# Patient Record
Sex: FEMALE | Race: WHITE
[De-identification: names, ages, dates, MRNs, and addresses within clinical notes are randomized per-mention and may not be internally consistent; named-entity substitution may affect disease eponyms.]

---

## 2018-11-02 ENCOUNTER — HOSPITAL ENCOUNTER (OUTPATIENT)
Dept: HOSPITAL 62 - LC | Age: 23
Discharge: HOME | End: 2018-11-02
Attending: OBSTETRICS & GYNECOLOGY
Payer: OTHER GOVERNMENT

## 2018-11-02 DIAGNOSIS — O24.419: Primary | ICD-10-CM

## 2018-11-02 DIAGNOSIS — Z3A.36: ICD-10-CM

## 2018-11-02 PROCEDURE — 4A1HXCZ MONITORING OF PRODUCTS OF CONCEPTION, CARDIAC RATE, EXTERNAL APPROACH: ICD-10-PCS | Performed by: OBSTETRICS & GYNECOLOGY

## 2018-11-02 PROCEDURE — 59025 FETAL NON-STRESS TEST: CPT

## 2018-11-02 NOTE — NON STRESS TEST REPORT
=================================================================

Non Stress Test

=================================================================

Datetime Report Generated by CPN: 11/02/2018 19:32

   

   

=================================================================

DEMOGRAPHIC

=================================================================

   

EGA NST:  36.0

   

=================================================================

INDICATION

=================================================================

   

Indication for Study:  Other

Indication for Study (NST) Other:  GDM

   

=================================================================

VITAL SIGNS

=================================================================

   

Temperature - NST:  98.8

Pulse - NST:  80

RESP - NST:  18

NBPSYS NST:  93

NBPDIA NST:  55

   

=================================================================

MONITORING

=================================================================

   

Monitor Explained:  Monitor Explained; Test Explained; Patient

   Verbalized Understanding

Time on Monitor:  11/02/2018 18:15

Time off Monitor:  11/02/2018 18:40

NST Duration:  25

   

=================================================================

NST INTERVENTIONS

=================================================================

   

NST Interventions:  PO Hydration

Physician Notified NST:  Dr. Austin

BABY A:  [id number]

   

=================================================================

BABY A

=================================================================

   

Fetal Movement :  Present

Contraction Frequency :  0

FHR Baseline :  120

Accelerations :  15X15

Decelerations :  None

Variability :  Moderate 6-25bpm

NST Review:  Meets Criteria for Reactive NST

NST Review and Verified By :  RASHIDA taylor RN

NST Results:  Reactive

   

=================================================================

NST REPORT

=================================================================

   

Report Trigger:  Send Report

## 2018-11-24 ENCOUNTER — HOSPITAL ENCOUNTER (INPATIENT)
Dept: HOSPITAL 62 - LC | Age: 23
LOS: 2 days | Discharge: HOME | End: 2018-11-26
Attending: OBSTETRICS & GYNECOLOGY | Admitting: OBSTETRICS & GYNECOLOGY
Payer: OTHER GOVERNMENT

## 2018-11-24 DIAGNOSIS — Z67.21: ICD-10-CM

## 2018-11-24 DIAGNOSIS — O26.893: ICD-10-CM

## 2018-11-24 DIAGNOSIS — Z3A.39: ICD-10-CM

## 2018-11-24 DIAGNOSIS — Z88.2: ICD-10-CM

## 2018-11-24 LAB
ADD MANUAL DIFF: NO
APPEARANCE UR: (no result)
APTT PPP: YELLOW S
BARBITURATES UR QL SCN: (no result)
BASOPHILS # BLD AUTO: 0 10^3/UL (ref 0–0.2)
BASOPHILS NFR BLD AUTO: 0.3 % (ref 0–2)
BILIRUB UR QL STRIP: NEGATIVE
EOSINOPHIL # BLD AUTO: 0 10^3/UL (ref 0–0.6)
EOSINOPHIL NFR BLD AUTO: 0.4 % (ref 0–6)
ERYTHROCYTE [DISTWIDTH] IN BLOOD BY AUTOMATED COUNT: 13.5 % (ref 11.5–14)
GLUCOSE UR STRIP-MCNC: NEGATIVE MG/DL
HCT VFR BLD CALC: 37.9 % (ref 36–47)
HGB BLD-MCNC: 13 G/DL (ref 12–15.5)
KETONES UR STRIP-MCNC: NEGATIVE MG/DL
LYMPHOCYTES # BLD AUTO: 2.3 10^3/UL (ref 0.5–4.7)
LYMPHOCYTES NFR BLD AUTO: 18.5 % (ref 13–45)
MCH RBC QN AUTO: 30.3 PG (ref 27–33.4)
MCHC RBC AUTO-ENTMCNC: 34.3 G/DL (ref 32–36)
MCV RBC AUTO: 88 FL (ref 80–97)
METHADONE UR QL SCN: NEGATIVE
MONOCYTES # BLD AUTO: 0.7 10^3/UL (ref 0.1–1.4)
MONOCYTES NFR BLD AUTO: 5.4 % (ref 3–13)
NEUTROPHILS # BLD AUTO: 9.5 10^3/UL (ref 1.7–8.2)
NEUTS SEG NFR BLD AUTO: 75.4 % (ref 42–78)
NITRITE UR QL STRIP: NEGATIVE
PCP UR QL SCN: NEGATIVE
PH UR STRIP: 6 [PH] (ref 5–9)
PLATELET # BLD: 248 10^3/UL (ref 150–450)
PROT UR STRIP-MCNC: NEGATIVE MG/DL
RBC # BLD AUTO: 4.3 10^6/UL (ref 3.72–5.28)
SP GR UR STRIP: 1.01
TOTAL CELLS COUNTED % (AUTO): 100 %
URINE AMPHETAMINES SCREEN: NEGATIVE
URINE BENZODIAZEPINES SCREEN: NEGATIVE
URINE COCAINE SCREEN: NEGATIVE
URINE MARIJUANA (THC) SCREEN: NEGATIVE
UROBILINOGEN UR-MCNC: NEGATIVE MG/DL (ref ?–2)
WBC # BLD AUTO: 12.6 10^3/UL (ref 4–10.5)

## 2018-11-24 PROCEDURE — 86901 BLOOD TYPING SEROLOGIC RH(D): CPT

## 2018-11-24 PROCEDURE — 86900 BLOOD TYPING SEROLOGIC ABO: CPT

## 2018-11-24 PROCEDURE — 86592 SYPHILIS TEST NON-TREP QUAL: CPT

## 2018-11-24 PROCEDURE — 80345 DRUG SCREENING BARBITURATES: CPT

## 2018-11-24 PROCEDURE — 86850 RBC ANTIBODY SCREEN: CPT

## 2018-11-24 PROCEDURE — 0HQ9XZZ REPAIR PERINEUM SKIN, EXTERNAL APPROACH: ICD-10-PCS | Performed by: OBSTETRICS & GYNECOLOGY

## 2018-11-24 PROCEDURE — 36415 COLL VENOUS BLD VENIPUNCTURE: CPT

## 2018-11-24 PROCEDURE — 81005 URINALYSIS: CPT

## 2018-11-24 PROCEDURE — 85461 HEMOGLOBIN FETAL: CPT

## 2018-11-24 PROCEDURE — 85025 COMPLETE CBC W/AUTO DIFF WBC: CPT

## 2018-11-24 PROCEDURE — 4A1HXCZ MONITORING OF PRODUCTS OF CONCEPTION, CARDIAC RATE, EXTERNAL APPROACH: ICD-10-PCS | Performed by: OBSTETRICS & GYNECOLOGY

## 2018-11-24 PROCEDURE — 80307 DRUG TEST PRSMV CHEM ANLYZR: CPT

## 2018-11-24 PROCEDURE — G0480 DRUG TEST DEF 1-7 CLASSES: HCPCS

## 2018-11-24 PROCEDURE — 85027 COMPLETE CBC AUTOMATED: CPT

## 2018-11-24 PROCEDURE — 94760 N-INVAS EAR/PLS OXIMETRY 1: CPT

## 2018-11-24 RX ADMIN — Medication SCH CAP: at 10:20

## 2018-11-24 RX ADMIN — FERROUS SULFATE TAB 325 MG (65 MG ELEMENTAL FE) SCH MG: 325 (65 FE) TAB at 10:20

## 2018-11-24 RX ADMIN — DOCUSATE SODIUM SCH MG: 100 CAPSULE, LIQUID FILLED ORAL at 10:20

## 2018-11-24 RX ADMIN — IBUPROFEN SCH MG: 800 TABLET, FILM COATED ORAL at 22:26

## 2018-11-24 RX ADMIN — FERROUS SULFATE TAB 325 MG (65 MG ELEMENTAL FE) SCH MG: 325 (65 FE) TAB at 18:08

## 2018-11-24 RX ADMIN — SENNOSIDES, DOCUSATE SODIUM SCH EACH: 50; 8.6 TABLET, FILM COATED ORAL at 10:20

## 2018-11-24 RX ADMIN — IBUPROFEN SCH MG: 800 TABLET, FILM COATED ORAL at 14:23

## 2018-11-24 RX ADMIN — DOCUSATE SODIUM SCH MG: 100 CAPSULE, LIQUID FILLED ORAL at 18:09

## 2018-11-24 NOTE — ADMISSION PHYSICAL
=================================================================



=================================================================

Datetime Report Generated by CPN: 2018 03:17

   

   

=================================================================

CURRENT ADMISSION

=================================================================

   

Chief Complaint:  Uterine Contractions

Indication for Induction:  Maternal Diabetes

Admit Impression :  Term, Intrauterine Pregnancy; Active Labor

Admit Plan:  Admit to Unit; Initiate Labor Protocol

   

=================================================================

ALLERGIES

=================================================================

   

Medication Allergies:  Yes

Medication Allergies:  Sulfa (Sulfonamide Antibiotics) (2018)

Latex:  No Latex Allergies

   

=================================================================

OBSTETRICAL HISTORY

=================================================================

   

EDC:  2018 00:00

:  2

Para:  1

Term:  1

Livin

   

=================================================================

***SEE PRENATAL RECORDS***

=================================================================

   

Alcohol:  No

Marijuana :  No

Cocaine:  No

Other Illicit Drugs:  No

Cigarettes:  Never Smoker. [id number]

   

=================================================================

PHYSICAL EXAM

=================================================================

   

General:  Normal

HEENT:  Normal

Neurologic:  Normal

Thyroid:  Normal

Heart:  Normal

Lungs:  Normal

Breast:  Normal

Back:  Normal

Abdomen:  Normal

Genitourinary Exam:  Normal

Extremities:  Normal

DTRs:  Normal

Pelvic Type:  Adequate

Vital Signs:  Reviewed

   

=================================================================

VAGINAL EXAM

=================================================================

   

Dilatation:  5

Effacement:  90

Station:  -1

Contraction Comments:  q 2-3

   

=================================================================

MEMBRANES

=================================================================

   

Membranes:  Intact

   

=================================================================

FETUS A

=================================================================

   

EGA:  39.1

Monitoring:  External US

FHR- Baseline:  120s

Accelerations:  15X15

Decelerations:  None

FHR Category:  Category I

Admit Comment:  Pt has h/o macrosomia.

   

=================================================================

PLANS FOR LABOR AND DELIVERY

=================================================================

   

Labor and Delivery:  None

Pain Management:  Epidural

Feeding Preference:  Breast

Circumcision:  N/A

   

=================================================================

INFORMED CONSENT

=================================================================

   

Signature:  Electronically signed by MD KYLIE Yanez) on

   2018 at 03:16  with User ID: [email or web address]

## 2018-11-24 NOTE — DELIVERY SUMMARY
=================================================================

Del Sum A-C

=================================================================

Datetime Report Generated by CPN: 2018 09:25

   

   

=================================================================

DELIVERY PERSONNEL

=================================================================

   

DELIVERY PERSONNEL:  [id number]

Delivery Doctor::  Taniya Mehta MD

Labor and Delivery Nurse::  Fallon Mitchell RN

Labor and Delivery Nurse::  Amy Marks RN

Nursery Nurse::  Brianne Gay, RN

   

=================================================================

MATERNAL INFORMATION

=================================================================

   

Delivery Anesthesia:  Epidural

Medications After Delivery:  Pitocin Drip 20 Units/1000ml NSS

Maternal Complications:  None

   

=================================================================

LABOR SUMMARY

=================================================================

   

EDC:  2018 00:00

No. Babies in Womb:  1

 Attempted:  No

Labor Anesthesia:  Epidural

   

=================================================================

LABOR INFORMATION

=================================================================

   

Reason for Induction:  Not Applicable

Complete Dilatation:  2018 05:10

Oxytocin:  N/A

Group B Beta Strep:  neg (Annotations: Data stored by [facility] on behalf of

   user)

Antibiotics # of Doses:  0

Steroids Given:  None

Reason Steroids Not Administered:  Not Applicable

Other Reason Not Administered:  not indicated

   

=================================================================

MEMBRANES

=================================================================

   

Membranes Rupture Method:  Spontaneous

Rupture of Membranes:  2018 05:17

Length of Rupture (hr):  0.62

Amniotic Fluid Color:  Clear

Amniotic Fluid Amount:  Moderate

Amniotic Fluid Odor:  Normal

   

=================================================================

STAGES OF LABOR

=================================================================

   

Stage 2 hr:  0

Stage 2 min:  44

Stage 3 hr:  0

Stage 3 min:  5

   

=================================================================

VAGINAL DELIVERY

=================================================================

   

Episiotomy:  None

Laceration #1:  Perineal

Laceration Extension #1:  First Degree

Laceration Repair:  Yes

Sponge Count Correct:  Yes

Sharps Count Correct:  Yes

   

=================================================================

CSECTION DELIVERY

=================================================================

   

Primary Indication:  N/A

CSection Incidence:  N/A

Labor:  N/A

Elective:  N/A

CSection Incision:  N/A

   

=================================================================

BABY A INFORMATION

=================================================================

   

Infant Delivery Date/Time:  2018 05:54

Method of Delivery:  Vaginal

Born in Route :  No

:  N/A

Forceps:  N/A

Vacuum Extraction:  N/A

Shoulder Dystocia :  No

   

=================================================================

PRESENTATION/POSITION BABY A

=================================================================

   

Presentation:  Cephalic

Cephalic Presentation:  Vertex

Breech Presentation:  N/A

   

=================================================================

PLACENTA INFORMATION BABY A

=================================================================

   

Placenta Delivery Time :  2018 05:59

Placenta Method of Delivery:  Spontaneous

Placenta Status:  Delivered

   

=================================================================

APGAR SCORES BABY A

=================================================================

   

Heart Rate 1 min:  >100 bpm

Resp Effort 1 min:  Slow, Irregular

Reflex Irritability 1 min:  Cough or Sneeze or Pulls Away

Muscle Tone 1 min:  Active Motion

Color 1 min:  Body Pink, Extremities Blue

APGAR SCORE 1 MIN:  8

Heart Rate 5 min:  >100 bpm

Resp Effort 5 min:  Good Cry

Reflex Irritability 5 min:  Cough or Sneeze or Pulls Away

Muscle Tone 5 min:  Active Motion

Color 5 min:  Body Pink, Extremities Blue

APGAR SCORE 5 MIN:  9

   

=================================================================

INFANT INFORMATION BABY A

=================================================================

   

Gestational Age at Delivery:  39.1

Gestational Status:  Full Term- 39- 40.6 Weeks

Infant Outcome :  Liveborn

Infant Condition :  Stable

Infant Sex:  Female

   

=================================================================

IDENTIFICATION BABY A

=================================================================

   

Infant Verification Date/Time:  2018 07:21

ID Band Number:  [id number]

Mother's Name Verified:  Yes

Infant Medical Record Number:  [id number]

RN Verifying Infant:  Mandi RN/ GAIL Haynes RN

   

=================================================================

CORD INFORMATION BABY A

=================================================================

   

No. Cord Vessels:  3

Nuchal Cord :  N/A

Cord Blood Taken:  Yes-For Eval (Mom's Blood Type - or O+)

Infant Suction:  Mouth; Nose

   

=================================================================

ASSESSMENT BABY A

=================================================================

   

Infant Respirations:  Appears Normal

Skin to Skin:  Yes

Neonatologist/ALS Called :  No

Infant Care By:  ZAIRA Cabrera RN

Transferred To:  Remains with Mother

   

=================================================================

BABY B INFORMATION

=================================================================

   

 :  N/A

   

=================================================================

ASSESSMENT BABY B

=================================================================

   

Skin to Skin Time (min):  yes

## 2018-11-25 LAB
ERYTHROCYTE [DISTWIDTH] IN BLOOD BY AUTOMATED COUNT: 13.7 % (ref 11.5–14)
HCT VFR BLD CALC: 36.3 % (ref 36–47)
HGB BLD-MCNC: 12.5 G/DL (ref 12–15.5)
MCH RBC QN AUTO: 30.6 PG (ref 27–33.4)
MCHC RBC AUTO-ENTMCNC: 34.4 G/DL (ref 32–36)
MCV RBC AUTO: 89 FL (ref 80–97)
PLATELET # BLD: 228 10^3/UL (ref 150–450)
RBC # BLD AUTO: 4.09 10^6/UL (ref 3.72–5.28)
WBC # BLD AUTO: 10.7 10^3/UL (ref 4–10.5)

## 2018-11-25 PROCEDURE — 3E0234Z INTRODUCTION OF SERUM, TOXOID AND VACCINE INTO MUSCLE, PERCUTANEOUS APPROACH: ICD-10-PCS | Performed by: OBSTETRICS & GYNECOLOGY

## 2018-11-25 RX ADMIN — SENNOSIDES, DOCUSATE SODIUM SCH EACH: 50; 8.6 TABLET, FILM COATED ORAL at 09:47

## 2018-11-25 RX ADMIN — IBUPROFEN SCH MG: 800 TABLET, FILM COATED ORAL at 05:38

## 2018-11-25 RX ADMIN — DOCUSATE SODIUM SCH MG: 100 CAPSULE, LIQUID FILLED ORAL at 09:47

## 2018-11-25 RX ADMIN — IBUPROFEN SCH MG: 800 TABLET, FILM COATED ORAL at 21:43

## 2018-11-25 RX ADMIN — FERROUS SULFATE TAB 325 MG (65 MG ELEMENTAL FE) SCH MG: 325 (65 FE) TAB at 09:47

## 2018-11-25 RX ADMIN — FERROUS SULFATE TAB 325 MG (65 MG ELEMENTAL FE) SCH MG: 325 (65 FE) TAB at 18:20

## 2018-11-25 RX ADMIN — IBUPROFEN SCH MG: 800 TABLET, FILM COATED ORAL at 13:10

## 2018-11-25 RX ADMIN — DOCUSATE SODIUM SCH MG: 100 CAPSULE, LIQUID FILLED ORAL at 18:20

## 2018-11-25 RX ADMIN — Medication SCH CAP: at 09:47

## 2018-11-25 NOTE — PDOC PROGRESS REPORT
Subjective-OB


Progress Note for:: 11/25/18


Subjective: 





reports bleeding slowing, pain controlled with current meds, denies needs





Physical Exam (OB)


Vital Signs: 


 











Temp Pulse Resp BP Pulse Ox


 


 98.7 F   76   18   125/74   99 


 


 11/25/18 07:51  11/25/18 07:51  11/25/18 07:51  11/25/18 07:51  11/25/18 07:51








 Intake & Output











 11/24/18 11/25/18 11/26/18





 06:59 06:59 06:59


 


Intake Total  400 


 


Balance  400 


 


Weight 108.5 kg  














- Abdomen


Description: Soft


Hernia Present: No


Fundal Description: Firm, Midline


Fundal Height: u/3 - u/4





- Abdominal


Distension: No distension


Tenderness: Nontender





- Extremities


Lower extremities: Cele's sign - neg


Calf: Normal, Nontender





Objective-Diagnostic


Laboratory: 


 





 11/25/18 07:44 





 











  11/25/18 11/25/18





  07:44 07:44


 


WBC  10.7 H 


 


RBC  4.09 


 


Hgb  12.5 


 


Hct  36.3 


 


MCV  89 


 


MCH  30.6 


 


MCHC  34.4 


 


RDW  13.7 


 


Plt Count  228 


 


Blood Type   B NEGATIVE














Assessment and Plan(PN)





- Assessment and Plan


(1) Normal vaginal delivery


Is this a current diagnosis for this admission?: Yes   





(2) Obstetrical laceration


Is this a current diagnosis for this admission?: Yes   





- Time Spent with Patient


Time with patient: Less than 15 minutes


Medications reviewed and adjusted accordingly: Yes





- Disposition


Anticipated Discharge: Home


Within: within 24 hours

## 2018-11-26 VITALS — SYSTOLIC BLOOD PRESSURE: 106 MMHG | DIASTOLIC BLOOD PRESSURE: 57 MMHG

## 2018-11-26 RX ADMIN — IBUPROFEN SCH MG: 800 TABLET, FILM COATED ORAL at 05:03

## 2018-11-26 RX ADMIN — FERROUS SULFATE TAB 325 MG (65 MG ELEMENTAL FE) SCH MG: 325 (65 FE) TAB at 09:07

## 2018-11-26 RX ADMIN — DOCUSATE SODIUM SCH MG: 100 CAPSULE, LIQUID FILLED ORAL at 09:07

## 2018-11-26 RX ADMIN — SENNOSIDES, DOCUSATE SODIUM SCH EACH: 50; 8.6 TABLET, FILM COATED ORAL at 09:07

## 2018-11-26 RX ADMIN — Medication SCH CAP: at 09:07

## 2018-11-26 NOTE — PDOC PROGRESS REPORT
Subjective-OB


Progress Note for:: 11/26/18


Subjective: 





Follow up at [facility] in 4 wks or prn.  Pelvic rest x 4-6 wks.





Physical Exam (OB)


Vital Signs: 


 











Temp Pulse Resp BP Pulse Ox


 


 98.1 F   65   17   106/57 L  100 


 


 11/26/18 07:53  11/26/18 07:53  11/26/18 07:53  11/26/18 07:53  11/26/18 07:53








 Intake & Output











 11/25/18 11/26/18 11/27/18





 06:59 06:59 06:59


 


Intake Total 400 300 


 


Balance 400 300 














- PIH/Pre-Eclampsia


DTR's: 2 +


Clonus: Negative


Headache: Absent


Epigastric Pain: No


Visual Changes: No





- Lochia


Lochia Amount: Small 10-25 ml


Lochia Color: Rubra/Red





- Abdomen


Description: Soft


Hernia Present: No


Bowel Sounds: Normoactive


Flatus Presence: Present


Stool: No


Fundal Description: Firm, Midline


Fundal Height: u/u - u/2





Objective-Diagnostic


Laboratory: 


 





 11/25/18 07:44 





 











  11/25/18





  07:44


 


Blood Type  B NEGATIVE














Assessment and Plan(PN)





- Time Spent with Patient


Medications reviewed and adjusted accordingly: Yes





- Disposition


Anticipated Discharge: Home

## 2018-11-26 NOTE — PDOC DISCHARGE SUMMARY
Final Diagnosis


Discharge Date: 18





- Final Diagnosis








(2) Normal vaginal delivery


Is this a current diagnosis for this admission?: Yes   





(3) Obstetrical laceration


Is this a current diagnosis for this admission?: Yes   





Discharge Data





- Discharge Medication


Prescriptions: 


Docusate Sodium [Colace 100 mg Capsule] 100 mg PO BID #60 capsule


Prenatal Vit/Dha [Prenatal Multi + Dha Capsule] 1 cap PO DAILY #30 capsule


Home Medications: 








Prenatal 118/Iron/Folate 6/Dha [[facility] Softgel] 1 tab PO DAILY 18 


Docusate Sodium [Colace 100 mg Capsule] 100 mg PO BID #60 capsule 18 


Prenatal Vit/Dha [Prenatal Multi + Dha Capsule] 1 cap PO DAILY #30 capsule  








Gestational Age: 39.1 wks


Reason(s) for Admission: Onset of Labor


Prenatal Procedures: Ultrasound


Intrapartum Procedure(s): Spontaneous Vaginal Delivery


Postpartum Complication(s): Laceration-Perineal


Laceration-Degree: 1st





- [facility] Data


  ** Baby 1 Female


Apgar at 1 minute: 8


Apgar at 5 minutes: 9


Weight: 3.827 kg


Home with Mother: Yes


Complications: No





- Diagnosis Test


Laboratory: 


 











Temp Pulse Resp BP Pulse Ox


 


 98.1 F   65   17   106/57 L  100 


 


 18 07:53  18 07:53  18 07:53  18 07:53  18 07:53








 











  18





  01:33 04:21 07:44


 


RBC   4.30  4.09


 


Hgb   13.0  12.5


 


Hct   37.9  36.3


 


Urine Opiates Screen  NEGATIVE  














- Discharge information/Instructions


Discharge Activity: Activity As Tolerated, Balance Activity w/Rest, Pelvic Rest

, Slowly Increase Activity, No tub bath


Discharge Diet: Regular


Disposition: HOME, SELF-CARE


Follow up with: [facility]


in: 4, Weeks

## 2019-02-12 ENCOUNTER — HOSPITAL ENCOUNTER (OUTPATIENT)
Dept: HOSPITAL 62 - END | Age: 24
Discharge: HOME | End: 2019-02-12
Attending: INTERNAL MEDICINE
Payer: OTHER GOVERNMENT

## 2019-02-12 VITALS — DIASTOLIC BLOOD PRESSURE: 53 MMHG | SYSTOLIC BLOOD PRESSURE: 106 MMHG

## 2019-02-12 DIAGNOSIS — F17.210: ICD-10-CM

## 2019-02-12 DIAGNOSIS — R76.11: ICD-10-CM

## 2019-02-12 DIAGNOSIS — R91.8: Primary | ICD-10-CM

## 2019-02-12 DIAGNOSIS — E66.9: ICD-10-CM

## 2019-02-12 DIAGNOSIS — Z20.1: ICD-10-CM

## 2019-02-12 LAB
ADD MANUAL DIFF: NO
ANION GAP SERPL CALC-SCNC: 9 MMOL/L (ref 5–19)
APTT BLD: 26.8 SEC (ref 23.5–35.8)
BASOPHILS # BLD AUTO: 0.1 10^3/UL (ref 0–0.2)
BASOPHILS NFR BLD AUTO: 0.9 % (ref 0–2)
BUN SERPL-MCNC: 15 MG/DL (ref 7–20)
CALCIUM: 9.7 MG/DL (ref 8.4–10.2)
CHLORIDE SERPL-SCNC: 106 MMOL/L (ref 98–107)
CO2 SERPL-SCNC: 27 MMOL/L (ref 22–30)
EOSINOPHIL # BLD AUTO: 0 10^3/UL (ref 0–0.6)
EOSINOPHIL NFR BLD AUTO: 0.8 % (ref 0–6)
ERYTHROCYTE [DISTWIDTH] IN BLOOD BY AUTOMATED COUNT: 13.4 % (ref 11.5–14)
GLUCOSE SERPL-MCNC: 90 MG/DL (ref 75–110)
HCT VFR BLD CALC: 39.7 % (ref 36–47)
HGB BLD-MCNC: 14 G/DL (ref 12–15.5)
INR PPP: 0.96
LYMPHOCYTES # BLD AUTO: 2.1 10^3/UL (ref 0.5–4.7)
LYMPHOCYTES NFR BLD AUTO: 37.3 % (ref 13–45)
MCH RBC QN AUTO: 30.5 PG (ref 27–33.4)
MCHC RBC AUTO-ENTMCNC: 35.4 G/DL (ref 32–36)
MCV RBC AUTO: 86 FL (ref 80–97)
MONOCYTES # BLD AUTO: 0.4 10^3/UL (ref 0.1–1.4)
MONOCYTES NFR BLD AUTO: 6.6 % (ref 3–13)
NEUTROPHILS # BLD AUTO: 3 10^3/UL (ref 1.7–8.2)
NEUTS SEG NFR BLD AUTO: 54.4 % (ref 42–78)
PLATELET # BLD: 260 10^3/UL (ref 150–450)
POTASSIUM SERPL-SCNC: 4.4 MMOL/L (ref 3.6–5)
PROTHROMBIN TIME: 13.3 SEC (ref 11.4–15.4)
RBC # BLD AUTO: 4.59 10^6/UL (ref 3.72–5.28)
SODIUM SERPL-SCNC: 142.1 MMOL/L (ref 137–145)
TOTAL CELLS COUNTED % (AUTO): 100 %
WBC # BLD AUTO: 5.6 10^3/UL (ref 4–10.5)

## 2019-02-12 PROCEDURE — 80048 BASIC METABOLIC PNL TOTAL CA: CPT

## 2019-02-12 PROCEDURE — 31624 DX BRONCHOSCOPE/LAVAGE: CPT

## 2019-02-12 PROCEDURE — 36415 COLL VENOUS BLD VENIPUNCTURE: CPT

## 2019-02-12 PROCEDURE — 87205 SMEAR GRAM STAIN: CPT

## 2019-02-12 PROCEDURE — 87077 CULTURE AEROBIC IDENTIFY: CPT

## 2019-02-12 PROCEDURE — 87206 SMEAR FLUORESCENT/ACID STAI: CPT

## 2019-02-12 PROCEDURE — 87070 CULTURE OTHR SPECIMN AEROBIC: CPT

## 2019-02-12 PROCEDURE — 85025 COMPLETE CBC W/AUTO DIFF WBC: CPT

## 2019-02-12 PROCEDURE — 85610 PROTHROMBIN TIME: CPT

## 2019-02-12 PROCEDURE — 87015 SPECIMEN INFECT AGNT CONCNTJ: CPT

## 2019-02-12 PROCEDURE — 87116 MYCOBACTERIA CULTURE: CPT

## 2019-02-12 PROCEDURE — 85730 THROMBOPLASTIN TIME PARTIAL: CPT

## 2019-02-12 PROCEDURE — 88162 CYTOPATH SMEAR OTHER SOURCE: CPT

## 2019-02-12 PROCEDURE — 87101 SKIN FUNGI CULTURE: CPT

## 2019-02-12 RX ADMIN — FENTANYL CITRATE ONE MCG: 50 INJECTION INTRAMUSCULAR; INTRAVENOUS at 09:04

## 2019-02-12 RX ADMIN — MIDAZOLAM HYDROCHLORIDE ONE MG: 1 INJECTION, SOLUTION INTRAMUSCULAR; INTRAVENOUS at 09:05

## 2019-02-12 RX ADMIN — MIDAZOLAM HYDROCHLORIDE ONE MG: 1 INJECTION, SOLUTION INTRAMUSCULAR; INTRAVENOUS at 08:55

## 2019-02-12 RX ADMIN — MIDAZOLAM HYDROCHLORIDE ONE MG: 1 INJECTION, SOLUTION INTRAMUSCULAR; INTRAVENOUS at 08:57

## 2019-02-12 RX ADMIN — FENTANYL CITRATE ONE MCG: 50 INJECTION INTRAMUSCULAR; INTRAVENOUS at 09:10

## 2019-02-12 RX ADMIN — MIDAZOLAM HYDROCHLORIDE ONE MG: 1 INJECTION, SOLUTION INTRAMUSCULAR; INTRAVENOUS at 09:01

## 2019-02-12 RX ADMIN — MIDAZOLAM HYDROCHLORIDE ONE MG: 1 INJECTION, SOLUTION INTRAMUSCULAR; INTRAVENOUS at 09:08

## 2019-02-12 RX ADMIN — MIDAZOLAM HYDROCHLORIDE ONE MG: 1 INJECTION, SOLUTION INTRAMUSCULAR; INTRAVENOUS at 09:07

## 2019-02-12 RX ADMIN — MIDAZOLAM HYDROCHLORIDE ONE MG: 1 INJECTION, SOLUTION INTRAMUSCULAR; INTRAVENOUS at 08:59

## 2019-02-12 RX ADMIN — MIDAZOLAM HYDROCHLORIDE ONE MG: 1 INJECTION, SOLUTION INTRAMUSCULAR; INTRAVENOUS at 09:02

## 2019-02-12 RX ADMIN — MIDAZOLAM HYDROCHLORIDE ONE MG: 1 INJECTION, SOLUTION INTRAMUSCULAR; INTRAVENOUS at 09:06

## 2019-02-12 RX ADMIN — FENTANYL CITRATE ONE MCG: 50 INJECTION INTRAMUSCULAR; INTRAVENOUS at 09:00

## 2019-02-12 RX ADMIN — MIDAZOLAM HYDROCHLORIDE ONE MG: 1 INJECTION, SOLUTION INTRAMUSCULAR; INTRAVENOUS at 09:03

## 2019-02-12 NOTE — OPERATIVE REPORT E
Operative Report



NAME: PATRICIA MARTIN

MRN:  [id number]          : 1995 AGE:  24Y

DATE OF SURGERY: 2019                        ROOM:



PREOPERATIVE DIAGNOSIS:

LEFT LOWER LOBE MASS/INFILTRATE, POSTERIOR BASAL SEGMENT.



POSTOPERATIVE DIAGNOSIS:





OPERATION:

Flexible bronchoscopy.



SURGEON:

REINA SCHREIBER M.D.



ANESTHESIA:

Topical using 1% and 2% lidocaine solutions.  Conscious sedation using IV

Versed and IV fentanyl.



SPECIMENS REMOVED:

1.   Bronchial washings, both lungs.

2.   Bronchoalveolar lavage specimen from the left lower lobe, posterior basal

     segment.



ESTIMATED BLOOD LOSS:

None.



INDICATION FOR PROCEDURE:

Patient is a 24-year-old  female presenting with left lower lobe

pulmonary infiltrate, left pulmonary mass.  Had exposure to pulmonary

tuberculosis in the past (mother) and was treated for latent TB with

positive PPD skin test.  Denies any fevers, chills, increasing cough or

sputum production.  Patient undergoing flexible bronchoscopy today.



PROCEDURE:

Consent was obtained from patient.  Patient verbalized understanding of

the indications, risks and potential complications of the procedure.  The

patient was connected to the cardiac monitor, pulse oximeter, blood

pressure monitor and respiratory monitor.  Then 2% lidocaine solution was

given via nebulizer for a total dose of 5 mL.  Hurricane spray x5 was

applied to the posterior pharyngeal wall and 1% lidocaine gel was applied

with a cotton swab to the posterior pharyngeal area.  Patient was given

Versed in increments of 0.5 mg for a total dose of 5 mg, and fentanyl in

increments of 25 mcg for a total dose of 75 mcg.



The flexible bronchoscope was inserted through the mouth guard and 1%

lidocaine solution in aliquots of 1 to 2 mL was given in the oropharyngeal

area, vocal cords, trachea, maia, left and right mainstem bronchi and

segmental airways, through which the flexible bronchoscope was advanced. 

Vocal cords appeared normal.  Trachea appeared normal.  No lesions noted. 

Maia were sharp, midline.  Right upper lobe, right lower lobe and right middle
lobe bronchi appeared patent

and normal.  No erythema or swelling noted.  The left lower lobe mainstem

bronchus appeared normal. Left upper lobe bronchi appeared normal.  The left

lower lobe bronchi appeared normal.  No endobronchial lesions noted.



Bronchoalveolar washings were performed of both lungs and bronchial lavage

was performed twice of the left lower lobe.  We sent the bronchial

washings and bronchial lavage for cytology, for cultures, AFB, bacteria

and fungus.



Would recommend followup in 2 to 3 weeks from today.  Patient agreed to go

home today.  Patient instructed to come back to the emergency room if

having more chest pain or increased shortness of breath for further

evaluation and management.





DICTATING PHYSICIAN:  REINA SCHREIBER MD,BEATA,MPH





[id number]                  DT: 2019    [id number]

PHY#: [id number]            DD: [id number]    [id number]

ID:   [id number]           JOB#: [id number]       ACCT: [id number]



cc:REINA SCHREIBER M.D.

>







[email or web address]